# Patient Record
Sex: FEMALE | Race: WHITE
[De-identification: names, ages, dates, MRNs, and addresses within clinical notes are randomized per-mention and may not be internally consistent; named-entity substitution may affect disease eponyms.]

---

## 2019-08-08 ENCOUNTER — HOSPITAL ENCOUNTER (OUTPATIENT)
Dept: HOSPITAL 92 - MRI | Age: 59
Discharge: HOME | End: 2019-08-08
Attending: FAMILY MEDICINE
Payer: COMMERCIAL

## 2019-08-08 DIAGNOSIS — M48.061: ICD-10-CM

## 2019-08-08 DIAGNOSIS — M71.38: ICD-10-CM

## 2019-08-08 DIAGNOSIS — M51.16: Primary | ICD-10-CM

## 2019-08-08 PROCEDURE — 72148 MRI LUMBAR SPINE W/O DYE: CPT

## 2019-08-08 NOTE — MRI
MRI LUMBAR SPINE WITHOUT CONTRAST:

 

INDICATIONS:

Lumbar radiculopathy.  Low back pain.

 

FINDINGS:

The lumbar vertebrae maintain normal height and alignment.  Disk spaces are maintained.  Vertebral judah
dy signal is normal.

 

No significant disk bulge or protrusion is seen at L1-L2 or at L2-L3.

 

At L3-L4, minimal disk bulge flattens the anterior thecal sac.  There is facet hypertrophy, resulting
 in minimal central canal stenosis.

 

At L4-L5, there is evidence of an annular fissure on the left with broad-based disk bulge flattening 
the thecal sac.  There is moderate facet and ligamentous hypertrophy.  These changes result in mild t
o moderate central canal stenosis.

 

At L5-S1, there is mild diffuse disk bulge.  Congenitally smaller thecal sac without central canal st
enosis.  The foramina appear patent.

 

IMPRESSION:

There is an annular fissure with a broad-based disk bulge at L4-L5, resulting in mild to moderate leon
tral canal stenosis.  There is prominent facet hypertrophy at this level with a synovial cyst identif
ied, which did not impinge on the spinal canal.

 

POS: [facility]

## 2019-08-08 NOTE — MRI
MRI LEFT HIP:

 

Date:  08/08/19 

 

PROVIDED CLINICAL HISTORY:   

Left hip pain. 

 

FINDINGS:

The left hip flexor, abductor, adductor, and hamstring tendons demonstrate an intact MR appearance. 

 

The amount of fluid within the left hip joint appears physiologic. The acetabular labrum and femoral-
acetabular articular cartilage are suboptimally evaluated in the absence of joint distention. 

 

No focal concerning regional marrow or muscular signal abnormality is evident. 

 

The courses of the regional major neurovascular structures appear unremarkable. 

 

IMPRESSION: 

No evidence for internal derangement. 

 

 

POS: OFF

## 2019-08-28 ENCOUNTER — HOSPITAL ENCOUNTER (OUTPATIENT)
Dept: HOSPITAL 92 - BICMAMMO | Age: 59
Discharge: HOME | End: 2019-08-28
Attending: FAMILY MEDICINE
Payer: COMMERCIAL

## 2019-08-28 DIAGNOSIS — Z12.31: Primary | ICD-10-CM

## 2019-08-28 PROCEDURE — 77067 SCR MAMMO BI INCL CAD: CPT

## 2019-08-28 PROCEDURE — 77063 BREAST TOMOSYNTHESIS BI: CPT

## 2019-08-28 NOTE — MMO
Bilateral MAMMO Bilat Screen [facility].

 

CLINICAL HISTORY:

Patient is 58 years old and is seen for screening.   The patient has no personal

history of cancer. The patient has a history of bilateral Implants in January, 2016.

 

VIEWS:

The views performed were:  bilateral craniocaudal; bilateral mediolateral

oblique; and bilateral Implant displaced with tomosynthesis.

 

FILMS COMPARED:

The present examination has been compared to prior imaging studies performed at

[facility] [place] on 10/31/2013, 02/05/2015, 07/18/2017 and 07/20/2018.

 

MAMMOGRAM FINDINGS:

There are scattered fibroglandular densities.

 

There are no suspicious masses, suspicious calcifications, or new areas of

architectural distortion.

 

IMPRESSION:

THERE IS NO MAMMOGRAPHIC EVIDENCE OF MALIGNANCY.

 

A ROUTINE FOLLOW-UP MAMMOGRAM IN 1 YEAR IS RECOMMENDED.

 

THE RESULTS OF THIS EXAM WERE SENT TO THE PATIENT.

 

ACR BI-RADS Category 1 - Negative

 

MAMMOGRAPHY NOTE:

 1. A negative mammogram report should not delay a biopsy if a dominant of

 clinically suspicious mass is present.

 2. Approximately 10% to 15% of breast cancers are not detected by

 mammography.

 3. Adenosis and dense breasts may obscure an underlying neoplasm.

 

 

Reported by: ADAN CHANEY MD

Electonically Signed: [id number]

## 2020-01-02 ENCOUNTER — HOSPITAL ENCOUNTER (OUTPATIENT)
Dept: HOSPITAL 92 - BICRAD | Age: 60
Discharge: HOME | End: 2020-01-02
Attending: NEUROLOGICAL SURGERY
Payer: COMMERCIAL

## 2020-01-02 DIAGNOSIS — M54.5: Primary | ICD-10-CM

## 2020-01-02 PROCEDURE — 72110 X-RAY EXAM L-2 SPINE 4/>VWS: CPT

## 2020-01-02 NOTE — RAD
FOUR VIEWS OF THE LUMBAR SPINE:

 

DATE: 1/2/2020.

 

COMPARISON: 

None.

 

HISTORY: 

Low back pain, prior fall.

 

FINDINGS: 

Clips and suture material noted in the left upper quadrant.  Five lumbar-type vertebral bodies are pr
esent with intact pedicles on frontal imaging.  Lateral imaging demonstrates normal vertebral body he
ight and alignment.  There is facet hypertrophy at L4-5 and L5-S1.  The lateral imaging includes neut
ral, flexion, and extension views, none of which demonstrate significant anterolisthesis or retrolist
hesis at any level.

 

IMPRESSION: 

No acute findings.

 

POS: [facility]

## 2020-08-27 ENCOUNTER — HOSPITAL ENCOUNTER (OUTPATIENT)
Dept: HOSPITAL 92 - TBSIIMAG | Age: 60
Discharge: HOME | End: 2020-08-27
Attending: NEUROLOGICAL SURGERY
Payer: COMMERCIAL

## 2020-08-27 DIAGNOSIS — M54.5: Primary | ICD-10-CM

## 2020-08-27 DIAGNOSIS — M48.061: ICD-10-CM

## 2020-08-27 PROCEDURE — 72131 CT LUMBAR SPINE W/O DYE: CPT

## 2020-08-28 NOTE — CT
CT LUMBAR SPINE WITHOUT CONTRAST:



INDICATIONS:

59-year-old female with low back pain after fall



COMPARISON:

MR the lumbar spine without contrast dated August 8, 2019



TECHNIQUE:

Multiple CT images were obtained of the lumbar spine without contrast. Axial, coronal, and sagittal r
eformatted images were constructed from the raw data.



FINDINGS:

Visualized retroperitoneal and paravertebral soft tissues: Within normal limits



Spinal alignment: Within normal limits.



Spinal instrumentation or postsurgical change: None



At L5-S1, there is no appreciable central canal or neuroforaminal narrowing..



At L4-5, there is a residual broad-based disc bulge at L4-5 inducing at least mild central canal narr
owing that appears similar to comparison MR. There is at least mild bilateral neural foraminal

narrowing that appears similar.



At L3-4, there is no appreciable central canal or neuroforaminal narrowing.



At L2-3, there is no appreciable central canal or neuroforaminal narrowing.



At L1-L2, there is no appreciable central canal or neuroforaminal narrowing.



At T12-L1, there is no appreciable central canal or neuroforaminal narrowing.



IMPRESSION:

1.  No acute fracture subluxation demonstrated.

2. Likely stable mild spondylosis of the lumbar spine with mild central canal narrowing and mild bila
teral neural foraminal narrowing at L4-5.



Reported By: Ed Abernathy 

Electronically Signed:  8/27/2020 1:18 PM

## 2020-10-26 ENCOUNTER — HOSPITAL ENCOUNTER (OUTPATIENT)
Dept: HOSPITAL 92 - SCSRAD | Age: 60
Discharge: HOME | End: 2020-10-26
Attending: FAMILY MEDICINE
Payer: COMMERCIAL

## 2020-10-26 DIAGNOSIS — M53.3: Primary | ICD-10-CM

## 2020-10-26 PROCEDURE — 72220 X-RAY EXAM SACRUM TAILBONE: CPT

## 2020-10-26 NOTE — RAD
EXAM:

XR Sacrum and Coccyx STANDARD



PROVIDED CLINICAL HISTORY:

Coccygeal pain. Patient states tailbone pain for past couple of years now more severe due to a fall 2
 years ago.



COMPARISON:

None



FINDINGS:

Sacroiliac joints are symmetric in appearance bilaterally. No fracture is seen. No suspicious sclerot
ic or lytic lesion is appreciated. Phleboliths overlie the left hemipelvis.



IMPRESSION:

No acute osseous abnormality.



Reported By: Preston Graves 

Electronically Signed:  10/26/2020 2:36 PM

## 2021-04-23 ENCOUNTER — HOSPITAL ENCOUNTER (OUTPATIENT)
Dept: HOSPITAL 92 - BICRAD | Age: 61
Discharge: HOME | End: 2021-04-23
Attending: FAMILY MEDICINE
Payer: COMMERCIAL

## 2021-04-23 DIAGNOSIS — J18.9: Primary | ICD-10-CM

## 2021-04-23 PROCEDURE — 71046 X-RAY EXAM CHEST 2 VIEWS: CPT

## 2023-03-29 ENCOUNTER — HOSPITAL ENCOUNTER (OUTPATIENT)
Dept: HOSPITAL 92 - CSHRAD | Age: 63
Discharge: HOME | End: 2023-03-29
Attending: PHYSICIAN ASSISTANT
Payer: COMMERCIAL
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
## 2023-03-29 DIAGNOSIS — M25.551: ICD-10-CM

## 2023-03-29 DIAGNOSIS — K58.1: Primary | ICD-10-CM

## 2023-03-29 DIAGNOSIS — M25.552: ICD-10-CM

## 2023-03-29 PROCEDURE — 74019 RADEX ABDOMEN 2 VIEWS: CPT

## 2023-03-29 PROCEDURE — 73521 X-RAY EXAM HIPS BI 2 VIEWS: CPT

## 2023-04-12 ENCOUNTER — HOSPITAL ENCOUNTER (OUTPATIENT)
Dept: HOSPITAL 92 - CSHRAD | Age: 63
Discharge: HOME | End: 2023-04-12
Attending: PHYSICIAN ASSISTANT
Payer: COMMERCIAL

## 2023-04-12 DIAGNOSIS — K58.1: Primary | ICD-10-CM

## 2023-04-12 PROCEDURE — 74019 RADEX ABDOMEN 2 VIEWS: CPT

## 2023-07-24 ENCOUNTER — HOSPITAL ENCOUNTER (EMERGENCY)
Dept: HOSPITAL 92 - CSHERS | Age: 63
Discharge: HOME | End: 2023-07-24
Payer: COMMERCIAL

## 2023-07-24 DIAGNOSIS — R11.2: ICD-10-CM

## 2023-07-24 DIAGNOSIS — R10.13: Primary | ICD-10-CM

## 2023-07-24 DIAGNOSIS — I10: ICD-10-CM

## 2023-07-24 LAB
ALBUMIN SERPL BCG-MCNC: 4.3 G/DL (ref 3.4–4.8)
ALP SERPL-CCNC: 90 U/L (ref 40–110)
ALT SERPL W P-5'-P-CCNC: 14 U/L (ref 8–55)
ANION GAP SERPL CALC-SCNC: 16 MMOL/L (ref 10–20)
AST SERPL-CCNC: 20 U/L (ref 5–34)
BACTERIA UR QL AUTO: (no result) HPF
BASOPHILS # BLD AUTO: 0 10X3/UL (ref 0–0.2)
BASOPHILS NFR BLD AUTO: 0.5 % (ref 0–2)
BILIRUB SERPL-MCNC: 0.5 MG/DL (ref 0.2–1.2)
BUN SERPL-MCNC: 5 MG/DL (ref 9.8–20.1)
CALCIUM SERPL-MCNC: 10.4 MG/DL (ref 7.8–10.44)
CAUTI INDICATIONS FOR CULTURE: (no result)
CHLORIDE SERPL-SCNC: 103 MMOL/L (ref 98–107)
CO2 SERPL-SCNC: 20 MMOL/L (ref 23–31)
CREAT CL PREDICTED SERPL C-G-VRATE: 0 ML/MIN (ref 70–130)
EOSINOPHIL # BLD AUTO: 0 10X3/UL (ref 0–0.5)
EOSINOPHIL NFR BLD AUTO: 0.5 % (ref 0–6)
GLOBULIN SER CALC-MCNC: 2.4 G/DL (ref 2.4–3.5)
GLUCOSE SERPL-MCNC: 101 MG/DL (ref 80–115)
HGB BLD-MCNC: 12.9 G/DL (ref 12–15.5)
LIPASE SERPL-CCNC: 30 U/L (ref 8–78)
LYMPHOCYTES NFR BLD AUTO: 15.4 % (ref 18–47)
MCH RBC QN AUTO: 32.1 PG (ref 27–33)
MCV RBC AUTO: 92.3 FL (ref 81.6–98.3)
MONOCYTES # BLD AUTO: 0.3 10X3/UL (ref 0–1.1)
MONOCYTES NFR BLD AUTO: 4.8 % (ref 0–10)
NEUTROPHILS # BLD AUTO: 4.4 10X3/UL (ref 1.5–8.4)
NEUTROPHILS NFR BLD AUTO: 78.6 % (ref 40–75)
PLATELET # BLD AUTO: 206 10X3/UL (ref 150–450)
POTASSIUM SERPL-SCNC: 3.7 MMOL/L (ref 3.5–5.1)
RBC # BLD AUTO: 4.02 10X6/UL (ref 3.9–5.03)
SODIUM SERPL-SCNC: 135 MMOL/L (ref 136–145)
SP GR UR STRIP: 1 (ref 1–1.03)
WBC # BLD AUTO: 5.6 10X3/UL (ref 3.5–10.5)

## 2023-07-24 PROCEDURE — S0028 INJECTION, FAMOTIDINE, 20 MG: HCPCS

## 2023-07-24 PROCEDURE — 80053 COMPREHEN METABOLIC PANEL: CPT

## 2023-07-24 PROCEDURE — 83605 ASSAY OF LACTIC ACID: CPT

## 2023-07-24 PROCEDURE — 96372 THER/PROPH/DIAG INJ SC/IM: CPT

## 2023-07-24 PROCEDURE — 85025 COMPLETE CBC W/AUTO DIFF WBC: CPT

## 2023-07-24 PROCEDURE — 96375 TX/PRO/DX INJ NEW DRUG ADDON: CPT

## 2023-07-24 PROCEDURE — 96374 THER/PROPH/DIAG INJ IV PUSH: CPT

## 2023-07-24 PROCEDURE — 83690 ASSAY OF LIPASE: CPT

## 2023-07-24 PROCEDURE — 96361 HYDRATE IV INFUSION ADD-ON: CPT

## 2023-07-24 PROCEDURE — 81001 URINALYSIS AUTO W/SCOPE: CPT

## 2023-12-19 ENCOUNTER — HOSPITAL ENCOUNTER (EMERGENCY)
Dept: HOSPITAL 92 - CSHERS | Age: 63
Discharge: HOME | End: 2023-12-19
Payer: COMMERCIAL

## 2023-12-19 DIAGNOSIS — F41.1: Primary | ICD-10-CM

## 2023-12-19 DIAGNOSIS — N39.0: ICD-10-CM

## 2023-12-19 DIAGNOSIS — I10: ICD-10-CM

## 2023-12-19 PROCEDURE — 96374 THER/PROPH/DIAG INJ IV PUSH: CPT

## 2023-12-19 PROCEDURE — 36416 COLLJ CAPILLARY BLOOD SPEC: CPT
